# Patient Record
Sex: FEMALE | Race: WHITE | NOT HISPANIC OR LATINO | URBAN - METROPOLITAN AREA
[De-identification: names, ages, dates, MRNs, and addresses within clinical notes are randomized per-mention and may not be internally consistent; named-entity substitution may affect disease eponyms.]

---

## 2018-12-13 ENCOUNTER — IMPORTED ENCOUNTER (OUTPATIENT)
Dept: URBAN - METROPOLITAN AREA CLINIC 9 | Facility: CLINIC | Age: 68
End: 2018-12-13

## 2018-12-21 ENCOUNTER — IMPORTED ENCOUNTER (OUTPATIENT)
Dept: URBAN - METROPOLITAN AREA CLINIC 9 | Facility: CLINIC | Age: 68
End: 2018-12-21

## 2018-12-27 ENCOUNTER — IMPORTED ENCOUNTER (OUTPATIENT)
Dept: URBAN - METROPOLITAN AREA CLINIC 9 | Facility: CLINIC | Age: 68
End: 2018-12-27

## 2019-02-12 NOTE — PATIENT DISCUSSION
run ESR and CRP due to temple area pain and some neck stiffness.  no jaw pain no weight loss.  BP has been very high lately (over last two weeks when it was checked here in [facility]).   could also be BP related vs sinus DZ.  no vision changes noted.

## 2019-02-12 NOTE — PATIENT DISCUSSION
2/15/19  per [facility] labs reviewed with patient on 2/14/19 EOD and labs are normal (ESR = 9 and CRP = <0.3).   Pt ed most likely culprit is sinus disease.  If pain more intense can run a CT of orbit/sinuses but I recommend at this point he see his PCP or a walk-in clinic to consider sinus disease.  He does say he has felt the pain is the less the last couple days.  Call STAT if worse.

## 2020-11-09 ENCOUNTER — IMPORTED ENCOUNTER (OUTPATIENT)
Dept: URBAN - METROPOLITAN AREA CLINIC 9 | Facility: CLINIC | Age: 70
End: 2020-11-09

## 2021-05-14 ENCOUNTER — IMPORTED ENCOUNTER (OUTPATIENT)
Dept: URBAN - METROPOLITAN AREA CLINIC 9 | Facility: CLINIC | Age: 71
End: 2021-05-14

## 2021-05-14 PROBLEM — H40.013: Noted: 2021-05-14

## 2021-10-17 ASSESSMENT — KERATOMETRY
OD_AXISANGLE2_DEGREES: 97
OD_K1POWER_DIOPTERS: 44.5
OS_AXISANGLE_DEGREES: 79
OD_AXISANGLE_DEGREES: 7
OS_AXISANGLE2_DEGREES: 148
OD_AXISANGLE2_DEGREES: 73
OD_K1POWER_DIOPTERS: 44.5
OS_K2POWER_DIOPTERS: 44.75
OS_AXISANGLE2_DEGREES: 169
OS_K2POWER_DIOPTERS: 43
OS_K1POWER_DIOPTERS: 44.5
OD_K2POWER_DIOPTERS: 44.75
OD_K2POWER_DIOPTERS: 45.25
OS_K1POWER_DIOPTERS: 40
OS_AXISANGLE_DEGREES: 58
OD_AXISANGLE_DEGREES: 163

## 2021-10-17 ASSESSMENT — VISUAL ACUITY
OD_SC: 20/30 - SN
OS_SC: 20/80 + SN
OD_CC: 20/25 - SN
OS_CC: 20/25 + SN
OS_CC: 20/20 - SN
OD_CC: 20/50 SN
OD_SC: 20/40 -2 SN
OD_SC: 20/40 + SN
OD_SC: 20/30 + SN
OD_CC: 20/25 -2 SN
OS_SC: 20/25 - SN
OS_SC: 20/40 + SN
OD_CC: 20/25 + SN
OS_CC: 20/50 -2 SN
OS_SC: 20/25 SN
OD_SC: 20/25 - SN

## 2021-10-17 ASSESSMENT — TONOMETRY
OS_IOP_MMHG: 18
OD_IOP_MMHG: 14
OS_IOP_MMHG: 13
OS_IOP_MMHG: 13
OD_IOP_MMHG: 14
OD_IOP_MMHG: 18
OS_IOP_MMHG: 14
OD_IOP_MMHG: 12
OD_IOP_MMHG: 19

## 2021-10-17 ASSESSMENT — PACHYMETRY
OD_CT_UM: 650.0
OS_CT_UM: 614.0

## 2021-12-22 NOTE — PATIENT DISCUSSION
pt ed needs Bx of this area ASAP as I am pretty certain this is a skin cancer and we need to be sure it is BCC and not SCC.

## 2021-12-22 NOTE — PATIENT DISCUSSION
mild.  Maxitrol gtts QID for 7 days.  apply a small amount of Maxitrol dasha to temporal adnexa/lid QHS OS for 14 days.  if this area does not respond and heal fully will recommend to Bx this area as patient says this area has been sensitive for 4-6 months.  call STAT if pain/red/dec [place].

## 2023-06-16 ENCOUNTER — ESTABLISHED PATIENT (OUTPATIENT)
Dept: URBAN - METROPOLITAN AREA CLINIC 17 | Facility: CLINIC | Age: 73
End: 2023-06-16

## 2023-06-16 DIAGNOSIS — H40.013: ICD-10-CM

## 2023-06-16 DIAGNOSIS — H43.812: ICD-10-CM

## 2023-06-16 PROCEDURE — 92134 CPTRZ OPH DX IMG PST SGM RTA: CPT

## 2023-06-16 PROCEDURE — 99214 OFFICE O/P EST MOD 30 MIN: CPT

## 2023-06-16 ASSESSMENT — VISUAL ACUITY
OD_SC: 20/30-1
OS_SC: 20/25-

## 2023-06-16 ASSESSMENT — TONOMETRY
OS_IOP_MMHG: 18
OD_IOP_MMHG: 17

## 2023-09-08 ENCOUNTER — ESTABLISHED PATIENT (OUTPATIENT)
Dept: URBAN - METROPOLITAN AREA CLINIC 17 | Facility: CLINIC | Age: 73
End: 2023-09-08

## 2023-09-08 DIAGNOSIS — H40.013: ICD-10-CM

## 2023-09-08 DIAGNOSIS — H43.812: ICD-10-CM

## 2023-09-08 PROCEDURE — 99214 OFFICE O/P EST MOD 30 MIN: CPT

## 2023-09-08 PROCEDURE — 92134 CPTRZ OPH DX IMG PST SGM RTA: CPT

## 2023-09-08 ASSESSMENT — VISUAL ACUITY
OU_SC: 20/20
OD_SC: 20/30+1
OS_SC: 20/20-2

## 2023-09-08 ASSESSMENT — TONOMETRY
OD_IOP_MMHG: 17
OS_IOP_MMHG: 17

## 2024-03-08 ENCOUNTER — FOLLOW UP (OUTPATIENT)
Dept: URBAN - METROPOLITAN AREA CLINIC 17 | Facility: CLINIC | Age: 74
End: 2024-03-08

## 2024-03-08 DIAGNOSIS — H40.013: ICD-10-CM

## 2024-03-08 DIAGNOSIS — H11.153: ICD-10-CM

## 2024-03-08 PROCEDURE — 99213 OFFICE O/P EST LOW 20 MIN: CPT

## 2024-03-08 ASSESSMENT — TONOMETRY
OD_IOP_MMHG: 14
OS_IOP_MMHG: 14

## 2024-03-08 ASSESSMENT — VISUAL ACUITY
OD_SC: 20/20-2
OS_SC: 20/25+1

## 2024-03-13 ENCOUNTER — DIAGNOSTICS ONLY (OUTPATIENT)
Dept: URBAN - METROPOLITAN AREA CLINIC 17 | Facility: CLINIC | Age: 74
End: 2024-03-13

## 2024-03-13 DIAGNOSIS — H40.013: ICD-10-CM

## 2024-03-13 PROCEDURE — 92083 EXTENDED VISUAL FIELD XM: CPT

## 2025-01-07 ENCOUNTER — COMPREHENSIVE EXAM (OUTPATIENT)
Age: 75
End: 2025-01-07

## 2025-01-07 DIAGNOSIS — H40.013: ICD-10-CM

## 2025-01-07 DIAGNOSIS — H43.812: ICD-10-CM

## 2025-01-07 PROCEDURE — 99214 OFFICE O/P EST MOD 30 MIN: CPT

## 2025-01-07 PROCEDURE — 92134 CPTRZ OPH DX IMG PST SGM RTA: CPT

## 2025-04-16 ENCOUNTER — DIAGNOSTICS ONLY (OUTPATIENT)
Age: 75
End: 2025-04-16

## 2025-04-16 DIAGNOSIS — H40.013: ICD-10-CM

## 2025-04-16 PROCEDURE — 92133 CPTRZD OPH DX IMG PST SGM ON: CPT

## 2025-04-16 PROCEDURE — 92083 EXTENDED VISUAL FIELD XM: CPT
